# Patient Record
Sex: MALE | URBAN - METROPOLITAN AREA
[De-identification: names, ages, dates, MRNs, and addresses within clinical notes are randomized per-mention and may not be internally consistent; named-entity substitution may affect disease eponyms.]

---

## 2020-05-13 ENCOUNTER — APPOINTMENT (RX ONLY)
Dept: URBAN - METROPOLITAN AREA CLINIC 106 | Facility: CLINIC | Age: 30
Setting detail: DERMATOLOGY
End: 2020-05-13

## 2020-05-13 VITALS — TEMPERATURE: 97.9 F

## 2020-05-13 DIAGNOSIS — B35.1 TINEA UNGUIUM: ICD-10-CM

## 2020-05-13 DIAGNOSIS — D22 MELANOCYTIC NEVI: ICD-10-CM

## 2020-05-13 PROBLEM — D22.61 MELANOCYTIC NEVI OF RIGHT UPPER LIMB, INCLUDING SHOULDER: Status: ACTIVE | Noted: 2020-05-13

## 2020-05-13 PROCEDURE — ? TREATMENT REGIMEN

## 2020-05-13 PROCEDURE — ? PRESCRIPTION

## 2020-05-13 PROCEDURE — ? DIAGNOSIS COMMENT

## 2020-05-13 PROCEDURE — ? COUNSELING

## 2020-05-13 PROCEDURE — ? ADDITIONAL NOTES

## 2020-05-13 PROCEDURE — 99202 OFFICE O/P NEW SF 15 MIN: CPT

## 2020-05-13 RX ORDER — EFINACONAZOLE 100 MG/ML
SOLUTION TOPICAL
Qty: 1 | Refills: 5 | Status: ERX | COMMUNITY
Start: 2020-05-13

## 2020-05-13 RX ADMIN — EFINACONAZOLE: 100 SOLUTION TOPICAL at 00:00

## 2020-05-13 ASSESSMENT — LOCATION SIMPLE DESCRIPTION DERM
LOCATION SIMPLE: RIGHT FOREARM
LOCATION SIMPLE: RIGHT GREAT TOE
LOCATION SIMPLE: LEFT GREAT TOE

## 2020-05-13 ASSESSMENT — LOCATION DETAILED DESCRIPTION DERM
LOCATION DETAILED: LEFT GREAT TOENAIL
LOCATION DETAILED: RIGHT GREAT TOENAIL
LOCATION DETAILED: RIGHT PROXIMAL DORSAL FOREARM

## 2020-05-13 ASSESSMENT — LOCATION ZONE DERM
LOCATION ZONE: TOENAIL
LOCATION ZONE: ARM

## 2020-05-13 NOTE — PROCEDURE: TREATMENT REGIMEN
Initiate Treatment: Jublia 10 % topical solution with applicator \\nDays Supply: 30\\nSig: Apply to the affected nails once a day after shower\\n\\nterbinafine HCl 250 mg tablet \\nSi tab po once a day
Detail Level: Zone

## 2020-06-29 ENCOUNTER — APPOINTMENT (RX ONLY)
Dept: URBAN - METROPOLITAN AREA CLINIC 106 | Facility: CLINIC | Age: 30
Setting detail: DERMATOLOGY
End: 2020-06-29

## 2020-06-29 VITALS — TEMPERATURE: 98.1 F

## 2020-06-29 DIAGNOSIS — L91.8 OTHER HYPERTROPHIC DISORDERS OF THE SKIN: ICD-10-CM

## 2020-06-29 DIAGNOSIS — B35.1 TINEA UNGUIUM: ICD-10-CM

## 2020-06-29 PROCEDURE — ? VENIPUNCTURE

## 2020-06-29 PROCEDURE — 36415 COLL VENOUS BLD VENIPUNCTURE: CPT

## 2020-06-29 PROCEDURE — ? PRESCRIPTION

## 2020-06-29 PROCEDURE — ? SKIN TAG REMOVAL (COSMETIC)

## 2020-06-29 PROCEDURE — ? DIAGNOSIS COMMENT

## 2020-06-29 PROCEDURE — ? TREATMENT REGIMEN

## 2020-06-29 PROCEDURE — ? ORDER TESTS

## 2020-06-29 PROCEDURE — 99213 OFFICE O/P EST LOW 20 MIN: CPT | Mod: 25

## 2020-06-29 RX ORDER — TERBINAFINE HYDROCHLORIDE 250 MG/1
TABLET ORAL
Qty: 90 | Refills: 1 | Status: ERX

## 2020-06-29 ASSESSMENT — LOCATION DETAILED DESCRIPTION DERM
LOCATION DETAILED: LEFT SUPERIOR LATERAL NECK
LOCATION DETAILED: LEFT CENTRAL POSTERIOR NECK
LOCATION DETAILED: LEFT INFERIOR LATERAL NECK
LOCATION DETAILED: LEFT CENTRAL LATERAL NECK

## 2020-06-29 ASSESSMENT — LOCATION SIMPLE DESCRIPTION DERM: LOCATION SIMPLE: NECK

## 2020-06-29 ASSESSMENT — LOCATION ZONE DERM: LOCATION ZONE: NECK

## 2020-06-29 NOTE — PROCEDURE: DIAGNOSIS COMMENT
Comment: Pt has taken 30 tabs, completed one month ago. Did not realize that he needed to refill/continue treatment. No AE/SE. Little change in nails at this time. Pt is a marathon runner - some traumatic changes to nails, though subungual debris/thickening characteristic of onychomycosis.
Detail Level: Simple

## 2020-06-29 NOTE — PROCEDURE: ORDER TESTS
Billing Type: Third-Party Bill
Performing Laboratory: [id number]
Bill For Surgical Tray: no
Lab Facility: [id number]
Expected Date Of Service: 06/29/2020

## 2020-06-29 NOTE — PROCEDURE: SKIN TAG REMOVAL (COSMETIC)
Consent: Written consent obtained and the risks of skin tag removal was reviewed with the patient including but not limited to bleeding, pigmentary change, infection, pain, and remote possibility of scarring.
Anesthesia Volume In Cc: 1
Anesthesia Type: 1% lidocaine with epinephrine
Detail Level: Detailed
Removed With: gradle excision

## 2020-06-29 NOTE — PROCEDURE: TREATMENT REGIMEN
Continue Regimen: Jublia 10 % topical solution with applicator \\nDays Supply: 30\\nSig: Apply to the affected nails once a day after shower\\n\\nterbinafine HCl 250 mg tablet \\nSi tab po once a day
Detail Level: Zone
Plan: Pt to take Lamisil 250mg qd x 90 days; RTC in 4mo.

## 2020-07-11 NOTE — PROCEDURE: ADDITIONAL NOTES
Pt here with ongoing back pain from a fall that she had 2 weeks ago in which she sustained fractures.   
Additional Notes: Discussed topical vs oral treatment - optimal treatment is both. Pt prefers to proceed with both. If any AE/SE, pt to d/c and let me know. RTC 6 weeks for hepatic panel. No po meds, little EtOH intake
Detail Level: Simple

## 2020-10-12 ENCOUNTER — APPOINTMENT (RX ONLY)
Dept: URBAN - METROPOLITAN AREA CLINIC 106 | Facility: CLINIC | Age: 30
Setting detail: DERMATOLOGY
End: 2020-10-12

## 2020-10-12 DIAGNOSIS — B35.1 TINEA UNGUIUM: ICD-10-CM | Status: RESOLVING

## 2020-10-12 PROCEDURE — ? TREATMENT REGIMEN

## 2020-10-12 PROCEDURE — 99213 OFFICE O/P EST LOW 20 MIN: CPT

## 2020-10-12 PROCEDURE — ? ADDITIONAL NOTES

## 2020-10-12 PROCEDURE — ? DIAGNOSIS COMMENT

## 2020-10-12 NOTE — PROCEDURE: TREATMENT REGIMEN
Continue Regimen: Jublia 10 % topical solution with applicator \\nDays Supply: 30\\nSig: Apply to the affected nails once a day after shower
Otc Regimen: Amlactin lotion once daily
Detail Level: Zone
Plan: Pt to take Lamisil 250mg qd x 90 days; RTC in 4mo.

## 2020-10-12 NOTE — PROCEDURE: ADDITIONAL NOTES
Detail Level: Simple
Additional Notes: Pt completed 90 day course of Lamisil approx 2 weeks ago; no AE/SE. Pt has also used Jublia during this period. Improved. To allow for another 3-4 months to observe for final outcome.